# Patient Record
(demographics unavailable — no encounter records)

---
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
## 2024-11-12 NOTE — PHYSICAL EXAM
[Healing] : healing [Size%: ______] : Size: [unfilled]% [Infected?] : Infected: No [3] : 3 out of 10 [Normal] : normal [None] : none [] : no [de-identified] : percocet [de-identified] : Pt seems less anxious at this visit  Face and ears-healed soft, resolving mild hypo and hyperpigmentation of skin and lips; lip softened white roll ; Neck , submental area-softer less tender raised scar ,chest and breast -much less shiny, slightly softer less tender, wrinkled raised thickened scars ; no open wounds; thick scar with contracture intermammary area  sl softening upper lateral border chest and neck burn sites some contracture banding and sl wrinkling;  Abdomen right hand -healed burns soft flat, fading hyperpigmented skin  Right thigh- upper aspect - hypopigmented healed skin in thick raised  scar ;  smaller thickened scars and hyper- pigmentation distal site  Full ROM neck  [TWNoteComboBox2] : Bacitracin
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
## 2024-11-12 NOTE — REVIEW OF SYSTEMS
[Fever] : no fever [Anxiety] : anxiety [Depression] : depression [Negative] : Respiratory [FreeTextEntry4] : chin scar pain [FreeTextEntry9] : neck stiffness  [de-identified] : burn wounds
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
## 2024-12-20 NOTE — REVIEW OF SYSTEMS
[Anxiety] : anxiety [Depression] : depression [FreeTextEntry9] : neck stiffness  [FreeTextEntry4] : chin scar pain [Negative] : ENT [Fever] : no fever [de-identified] : burn scarring
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
## 2024-12-20 NOTE — REVIEW OF SYSTEMS
[Anxiety] : anxiety [Depression] : depression [FreeTextEntry9] : neck stiffness  [FreeTextEntry4] : chin scar pain [Negative] : ENT [Fever] : no fever [de-identified] : burn scarring
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
